# Patient Record
Sex: FEMALE | Race: WHITE | Employment: PART TIME | URBAN - METROPOLITAN AREA
[De-identification: names, ages, dates, MRNs, and addresses within clinical notes are randomized per-mention and may not be internally consistent; named-entity substitution may affect disease eponyms.]

---

## 2017-01-04 ENCOUNTER — OFFICE VISIT (OUTPATIENT)
Dept: INTERNAL MEDICINE CLINIC | Facility: CLINIC | Age: 57
End: 2017-01-04

## 2017-01-04 VITALS
HEART RATE: 55 BPM | SYSTOLIC BLOOD PRESSURE: 150 MMHG | DIASTOLIC BLOOD PRESSURE: 92 MMHG | RESPIRATION RATE: 14 BRPM | BODY MASS INDEX: 33.66 KG/M2 | WEIGHT: 190 LBS | HEIGHT: 63 IN

## 2017-01-04 DIAGNOSIS — I10 ESSENTIAL HYPERTENSION: Primary | ICD-10-CM

## 2017-01-04 PROCEDURE — 99214 OFFICE O/P EST MOD 30 MIN: CPT | Performed by: INTERNAL MEDICINE

## 2017-01-04 PROCEDURE — 99212 OFFICE O/P EST SF 10 MIN: CPT | Performed by: INTERNAL MEDICINE

## 2017-01-04 NOTE — PROGRESS NOTES
HPI:    Patient ID: Radha Centeno is a 49year old female. Hypertension  This is a chronic problem. The current episode started more than 1 year ago. The problem has been gradually worsening since onset. The problem is resistant.  Associated symptoms sounds are normal. She exhibits no distension. There is no tenderness. Musculoskeletal: Normal range of motion. Neurological: She is alert and oriented to person, place, and time. She has normal reflexes.    Skin: She is not diaphoretic.        01/04/17

## 2017-01-11 NOTE — TELEPHONE ENCOUNTER
Please advise on refill request    No labs    Hypertensive Medications  Protocol Criteria:  · Appointment scheduled in the past 6 months or in the next 3 months  · BMP or CMP in the past 12 months  · Creatinine result < 2  Recent Visits       Provider Salome

## 2017-01-12 RX ORDER — LOSARTAN POTASSIUM AND HYDROCHLOROTHIAZIDE 25; 100 MG/1; MG/1
TABLET ORAL
Qty: 90 TABLET | Refills: 3 | Status: SHIPPED | OUTPATIENT
Start: 2017-01-12 | End: 2018-11-05

## 2017-01-23 ENCOUNTER — LAB ENCOUNTER (OUTPATIENT)
Dept: LAB | Age: 57
End: 2017-01-23
Attending: INTERNAL MEDICINE
Payer: COMMERCIAL

## 2017-01-23 DIAGNOSIS — I10 ESSENTIAL HYPERTENSION, MALIGNANT: Primary | ICD-10-CM

## 2017-01-23 LAB
CHOLEST SERPL-MCNC: 315 MG/DL (ref 110–200)
HDLC SERPL-MCNC: 56 MG/DL
LDLC SERPL CALC-MCNC: 196 MG/DL (ref 0–99)
NONHDLC SERPL-MCNC: 259 MG/DL
TRIGL SERPL-MCNC: 314 MG/DL (ref 1–149)

## 2017-01-23 PROCEDURE — 80061 LIPID PANEL: CPT

## 2017-01-23 PROCEDURE — 36415 COLL VENOUS BLD VENIPUNCTURE: CPT

## 2017-02-27 ENCOUNTER — LAB ENCOUNTER (OUTPATIENT)
Dept: LAB | Age: 57
End: 2017-02-27
Attending: INTERNAL MEDICINE
Payer: COMMERCIAL

## 2017-02-27 DIAGNOSIS — E78.2 MIXED HYPERLIPIDEMIA: Primary | ICD-10-CM

## 2017-02-27 DIAGNOSIS — E78.5 HYPERLIPEMIA: ICD-10-CM

## 2017-02-27 LAB
CHOLEST SERPL-MCNC: 191 MG/DL (ref 110–200)
HDLC SERPL-MCNC: 31 MG/DL
LDLC SERPL CALC-MCNC: 97 MG/DL (ref 0–99)
NONHDLC SERPL-MCNC: 160 MG/DL
TRIGL SERPL-MCNC: 315 MG/DL (ref 1–149)

## 2017-02-27 PROCEDURE — 36415 COLL VENOUS BLD VENIPUNCTURE: CPT

## 2017-02-27 PROCEDURE — 80061 LIPID PANEL: CPT

## 2017-02-27 PROCEDURE — 82306 VITAMIN D 25 HYDROXY: CPT

## 2017-03-01 LAB — 25(OH)D3 SERPL-MCNC: 36.3 NG/ML

## 2017-08-21 ENCOUNTER — LAB ENCOUNTER (OUTPATIENT)
Dept: LAB | Age: 57
End: 2017-08-21
Attending: INTERNAL MEDICINE
Payer: COMMERCIAL

## 2017-08-21 DIAGNOSIS — I25.10 CORONARY ARTERY DISEASE: Primary | ICD-10-CM

## 2017-08-21 LAB
CHOLEST SERPL-MCNC: 201 MG/DL (ref 110–200)
HDLC SERPL-MCNC: 42 MG/DL
LDLC SERPL CALC-MCNC: 110 MG/DL (ref 0–99)
NONHDLC SERPL-MCNC: 159 MG/DL
TRIGL SERPL-MCNC: 244 MG/DL (ref 1–149)

## 2017-08-21 PROCEDURE — 36415 COLL VENOUS BLD VENIPUNCTURE: CPT

## 2017-08-21 PROCEDURE — 80061 LIPID PANEL: CPT

## 2017-11-22 ENCOUNTER — LAB ENCOUNTER (OUTPATIENT)
Dept: LAB | Age: 57
End: 2017-11-22
Attending: INTERNAL MEDICINE
Payer: COMMERCIAL

## 2017-11-22 DIAGNOSIS — I25.10 CORONARY ATHEROSCLEROSIS OF NATIVE CORONARY ARTERY: Primary | ICD-10-CM

## 2017-11-22 PROCEDURE — 80061 LIPID PANEL: CPT

## 2017-11-22 PROCEDURE — 36415 COLL VENOUS BLD VENIPUNCTURE: CPT

## 2018-01-15 RX ORDER — LOSARTAN POTASSIUM AND HYDROCHLOROTHIAZIDE 25; 100 MG/1; MG/1
TABLET ORAL
Qty: 90 TABLET | Refills: 0 | OUTPATIENT
Start: 2018-01-15

## 2018-02-20 ENCOUNTER — HOSPITAL ENCOUNTER (OUTPATIENT)
Age: 58
Discharge: HOME | End: 2018-02-20
Payer: COMMERCIAL

## 2018-02-20 VITALS
RESPIRATION RATE: 18 BRPM | SYSTOLIC BLOOD PRESSURE: 181 MMHG | WEIGHT: 165 LBS | HEART RATE: 60 BPM | BODY MASS INDEX: 29 KG/M2 | OXYGEN SATURATION: 98 % | DIASTOLIC BLOOD PRESSURE: 69 MMHG | TEMPERATURE: 98 F

## 2018-02-20 DIAGNOSIS — B34.9 ACUTE VIRAL SYNDROME: Primary | ICD-10-CM

## 2018-02-20 LAB — S PYO AG THROAT QL: NEGATIVE

## 2018-02-20 PROCEDURE — 99213 OFFICE O/P EST LOW 20 MIN: CPT

## 2018-02-20 PROCEDURE — 87430 STREP A AG IA: CPT

## 2018-02-20 PROCEDURE — 99204 OFFICE O/P NEW MOD 45 MIN: CPT

## 2018-02-20 RX ORDER — OFLOXACIN 3 MG/ML
SOLUTION AURICULAR (OTIC) DAILY
Qty: 1 BOTTLE | Refills: 0 | Status: SHIPPED | OUTPATIENT
Start: 2018-02-20 | End: 2018-02-27

## 2018-02-20 RX ORDER — OFLOXACIN 3 MG/ML
SOLUTION AURICULAR (OTIC) DAILY
Qty: 1 BOTTLE | Refills: 0 | Status: SHIPPED | OUTPATIENT
Start: 2018-02-20 | End: 2018-02-20

## 2018-02-20 RX ORDER — METOPROLOL SUCCINATE 50 MG/1
50 TABLET, EXTENDED RELEASE ORAL DAILY
COMMUNITY
End: 2018-11-05

## 2018-02-20 NOTE — ED PROVIDER NOTES
Patient presents with:  Cough/URI      HPI:     Philip Ramirez is a 56year old female with a past history of hypertension presents with cough, congestion and sore throat since Friday. Patient denies any fever chills.   Patient reports cough is wet and chest pain. Has not experienced any fever or chills. pt ear canals erythematous post irrigation. Will prescribe antibiotic drops. Instructed patient to continue supportive care at home as well as close follow-up with primary care provider.   Patient Judy Suero

## 2018-03-12 ENCOUNTER — APPOINTMENT (OUTPATIENT)
Dept: GENERAL RADIOLOGY | Age: 58
End: 2018-03-12
Attending: PEDIATRICS
Payer: COMMERCIAL

## 2018-03-12 ENCOUNTER — HOSPITAL ENCOUNTER (OUTPATIENT)
Age: 58
Discharge: HOME | End: 2018-03-12
Attending: PEDIATRICS
Payer: COMMERCIAL

## 2018-03-12 VITALS
TEMPERATURE: 98 F | SYSTOLIC BLOOD PRESSURE: 124 MMHG | RESPIRATION RATE: 16 BRPM | DIASTOLIC BLOOD PRESSURE: 62 MMHG | HEART RATE: 53 BPM | BODY MASS INDEX: 29.23 KG/M2 | OXYGEN SATURATION: 98 % | HEIGHT: 63 IN | WEIGHT: 165 LBS

## 2018-03-12 DIAGNOSIS — J45.21 MILD INTERMITTENT REACTIVE AIRWAY DISEASE WITH ACUTE EXACERBATION: ICD-10-CM

## 2018-03-12 DIAGNOSIS — J06.9 UPPER RESPIRATORY TRACT INFECTION, UNSPECIFIED TYPE: Primary | ICD-10-CM

## 2018-03-12 PROCEDURE — 71046 X-RAY EXAM CHEST 2 VIEWS: CPT | Performed by: PEDIATRICS

## 2018-03-12 PROCEDURE — 99214 OFFICE O/P EST MOD 30 MIN: CPT

## 2018-03-12 PROCEDURE — 94640 AIRWAY INHALATION TREATMENT: CPT

## 2018-03-12 RX ORDER — IPRATROPIUM BROMIDE AND ALBUTEROL SULFATE 2.5; .5 MG/3ML; MG/3ML
3 SOLUTION RESPIRATORY (INHALATION) ONCE
Status: COMPLETED | OUTPATIENT
Start: 2018-03-12 | End: 2018-03-12

## 2018-03-12 RX ORDER — PREDNISONE 20 MG/1
40 TABLET ORAL DAILY
Qty: 10 TABLET | Refills: 0 | Status: SHIPPED | OUTPATIENT
Start: 2018-03-12 | End: 2018-03-17

## 2018-03-12 RX ORDER — ALBUTEROL SULFATE 90 UG/1
2 AEROSOL, METERED RESPIRATORY (INHALATION) EVERY 4 HOURS PRN
Qty: 1 INHALER | Refills: 0 | Status: SHIPPED | OUTPATIENT
Start: 2018-03-12 | End: 2018-04-11

## 2018-03-12 NOTE — ED PROVIDER NOTES
Patient presents with:  Cough/URI      HPI:     Penny Koroma is a 56year old female who presents for evaluation of a chief complaint of upper respiratory symptoms for 3-4 days. Patient notes congestion and cough.   She feels some chest congestion an Intermittent nebulizer      predniSONE 20 MG Oral Tab          Sig: Take 2 tablets (40 mg total) by mouth daily.           Dispense:  10 tablet          Refill:  0      Albuterol Sulfate  (90 Base) MCG/ACT Inhalation Aero Soln          Sig: Inhale 2

## 2018-03-12 NOTE — ED INITIAL ASSESSMENT (HPI)
Cough with green phlegm, sinus pressure, congestion and runny nose since Friday. Denies fever, chills or body aches.

## 2018-03-14 ENCOUNTER — OFFICE VISIT (OUTPATIENT)
Dept: INTERNAL MEDICINE CLINIC | Facility: CLINIC | Age: 58
End: 2018-03-14

## 2018-03-14 VITALS
BODY MASS INDEX: 29.41 KG/M2 | HEIGHT: 63 IN | HEART RATE: 49 BPM | DIASTOLIC BLOOD PRESSURE: 86 MMHG | SYSTOLIC BLOOD PRESSURE: 152 MMHG | TEMPERATURE: 99 F | WEIGHT: 166 LBS

## 2018-03-14 DIAGNOSIS — I10 ESSENTIAL HYPERTENSION: ICD-10-CM

## 2018-03-14 DIAGNOSIS — J40 BRONCHITIS: Primary | ICD-10-CM

## 2018-03-14 PROCEDURE — 99214 OFFICE O/P EST MOD 30 MIN: CPT | Performed by: INTERNAL MEDICINE

## 2018-03-14 PROCEDURE — 99212 OFFICE O/P EST SF 10 MIN: CPT | Performed by: INTERNAL MEDICINE

## 2018-03-27 NOTE — PROGRESS NOTES
HPI:    Patient ID: Karine Johnson is a 56year old female. HPI     [facility] 3/12/18  Karine Johnson is a 56year old female who presents for evaluation of a chief complaint of upper respiratory symptoms for 3-4 days.   Patient notes congestion and cou disease with acute exacerbation J45.21        She was given a DuoNeb here and had improvement of her exam.  She was coughing less.   She states the personally she does not feel like it helped her very much but she was talking about from a congestion standpo Oral Tab TAKE 1 TABLET BY MOUTH DAILY Disp: 90 tablet Rfl: 3   AmLODIPine Besylate 2.5 MG Oral Tab TK 1 T PO QD Disp:  Rfl: 0     Allergies:  Codeine                     HISTORY:  Past Medical History:   Diagnosis Date   • Essential hypertension       Past REFILLED ALL MEDICATIONS AS NEEDED  SIDE EFFECTS DISCUSSED  REVIEWED ALL RECENT LABS AND DIAGNOSTIC TESTING IN DETAIL  REVIEWED URGENT CARE  RECORDS RECENTLY   TAKE ALL YOU MEDICATIONS AS DIRECTED    CALL MD OR FOLLOW UP IF YOU HAVE ANY QUESTIONS.   NEXT OF

## 2018-11-05 ENCOUNTER — OFFICE VISIT (OUTPATIENT)
Dept: INTERNAL MEDICINE CLINIC | Facility: CLINIC | Age: 58
End: 2018-11-05
Payer: COMMERCIAL

## 2018-11-05 ENCOUNTER — LAB ENCOUNTER (OUTPATIENT)
Dept: LAB | Age: 58
End: 2018-11-05
Attending: INTERNAL MEDICINE
Payer: COMMERCIAL

## 2018-11-05 VITALS
BODY MASS INDEX: 30.3 KG/M2 | WEIGHT: 171 LBS | DIASTOLIC BLOOD PRESSURE: 84 MMHG | SYSTOLIC BLOOD PRESSURE: 110 MMHG | HEIGHT: 63 IN | HEART RATE: 109 BPM

## 2018-11-05 DIAGNOSIS — Z12.11 COLON CANCER SCREENING: ICD-10-CM

## 2018-11-05 DIAGNOSIS — Z00.00 ROUTINE GENERAL MEDICAL EXAMINATION AT A HEALTH CARE FACILITY: ICD-10-CM

## 2018-11-05 DIAGNOSIS — Z00.00 ROUTINE GENERAL MEDICAL EXAMINATION AT A HEALTH CARE FACILITY: Primary | ICD-10-CM

## 2018-11-05 DIAGNOSIS — Z12.31 VISIT FOR SCREENING MAMMOGRAM: ICD-10-CM

## 2018-11-05 PROCEDURE — 83721 ASSAY OF BLOOD LIPOPROTEIN: CPT

## 2018-11-05 PROCEDURE — 80061 LIPID PANEL: CPT

## 2018-11-05 PROCEDURE — 83036 HEMOGLOBIN GLYCOSYLATED A1C: CPT

## 2018-11-05 PROCEDURE — 82306 VITAMIN D 25 HYDROXY: CPT

## 2018-11-05 PROCEDURE — 80053 COMPREHEN METABOLIC PANEL: CPT

## 2018-11-05 PROCEDURE — 84439 ASSAY OF FREE THYROXINE: CPT

## 2018-11-05 PROCEDURE — 81015 MICROSCOPIC EXAM OF URINE: CPT

## 2018-11-05 PROCEDURE — 36415 COLL VENOUS BLD VENIPUNCTURE: CPT

## 2018-11-05 PROCEDURE — 84443 ASSAY THYROID STIM HORMONE: CPT

## 2018-11-05 PROCEDURE — 99396 PREV VISIT EST AGE 40-64: CPT | Performed by: INTERNAL MEDICINE

## 2018-11-05 PROCEDURE — 85027 COMPLETE CBC AUTOMATED: CPT

## 2018-11-05 RX ORDER — METOPROLOL SUCCINATE 50 MG/1
50 TABLET, EXTENDED RELEASE ORAL DAILY
Qty: 90 TABLET | Refills: 3 | Status: SHIPPED | OUTPATIENT
Start: 2018-11-05 | End: 2019-11-01

## 2018-11-05 RX ORDER — AMLODIPINE BESYLATE 2.5 MG/1
TABLET ORAL
Qty: 90 TABLET | Refills: 3 | Status: SHIPPED | OUTPATIENT
Start: 2018-11-05

## 2018-11-05 RX ORDER — LOSARTAN POTASSIUM AND HYDROCHLOROTHIAZIDE 25; 100 MG/1; MG/1
1 TABLET ORAL
Qty: 90 TABLET | Refills: 3 | Status: SHIPPED | OUTPATIENT
Start: 2018-11-05 | End: 2019-11-01

## 2018-11-05 NOTE — PROGRESS NOTES
HPI:    Patient ID: Janusz Burr is a 66year old female.     HPI     Physical exam  Sees gyne  At [facility]  Will see derm for numerous moles    /84   Pulse 109   Ht 5' 3\" (1.6 m)   Wt 171 lb (77.6 kg)   BMI 30.29 kg/m²   Wt Readings from Last 6 Past Surgical History:   Procedure Laterality Date   • ABLATION        Family History   Problem Relation Age of Onset   • Cancer Father         lung and prostate   • Hypertension Brother       Social History: Social History    Tobacco Use      Smokin STIM HORMONE, FREE T4 (FREE         THYROXINE), LIPID PANEL, CBC, PLATELET; NO         DIFFERENTIAL, COMP METABOLIC PANEL (14),         HEMOGLOBIN A1C, URINE MICROSCOPIC W REFLEX         CULTURE        Generally healthy  Wt Readings from Last 6 Encounters:

## 2018-11-12 DIAGNOSIS — E78.5 ELEVATED LIPIDS: Primary | ICD-10-CM

## 2019-02-13 ENCOUNTER — OFFICE VISIT (OUTPATIENT)
Dept: INTERNAL MEDICINE CLINIC | Facility: CLINIC | Age: 59
End: 2019-02-13
Payer: COMMERCIAL

## 2019-02-13 VITALS
HEART RATE: 50 BPM | SYSTOLIC BLOOD PRESSURE: 124 MMHG | WEIGHT: 165 LBS | TEMPERATURE: 99 F | BODY MASS INDEX: 29.23 KG/M2 | DIASTOLIC BLOOD PRESSURE: 73 MMHG | HEIGHT: 63 IN

## 2019-02-13 DIAGNOSIS — Z12.4 CERVICAL CANCER SCREENING: ICD-10-CM

## 2019-02-13 DIAGNOSIS — Z00.00 ROUTINE GENERAL MEDICAL EXAMINATION AT A HEALTH CARE FACILITY: Primary | ICD-10-CM

## 2019-02-13 DIAGNOSIS — N76.1 SUBACUTE VAGINITIS: ICD-10-CM

## 2019-02-13 PROCEDURE — 99396 PREV VISIT EST AGE 40-64: CPT | Performed by: INTERNAL MEDICINE

## 2019-02-13 PROCEDURE — 88175 CYTOPATH C/V AUTO FLUID REDO: CPT | Performed by: INTERNAL MEDICINE

## 2019-02-13 RX ORDER — DOXEPIN HYDROCHLORIDE 25 MG/1
25 CAPSULE ORAL NIGHTLY
Qty: 90 CAPSULE | Refills: 0 | Status: SHIPPED | OUTPATIENT
Start: 2019-02-13 | End: 2019-10-17

## 2019-02-14 LAB — HPV I/H RISK 1 DNA SPEC QL NAA+PROBE: NEGATIVE

## 2019-02-15 LAB
GENITAL VAGINOSIS SCREEN: NEGATIVE
TRICHOMONAS SCREEN: NEGATIVE

## 2019-02-22 NOTE — PROGRESS NOTES
HPI:    Patient ID: Luis Lay is a 66year old female.     HPI    Physical exam  genearlly helathy  HTN  Long standing history of hypertension     sympotms  :        Headache no  dizziness        no                             Blurred vision no  pa and behavioral problems. Current Outpatient Medications:  Doxepin HCl 25 MG Oral Cap Take 1 capsule (25 mg total) by mouth nightly.  Disp: 90 capsule Rfl: 0   Metoprolol Succinate ER 50 MG Oral Tablet 24 Hr Take 1 tablet (50 mg total) by mouth adelaida hernia.    Genitourinary:   Genitourinary Comments: Breast  No asymmetry   No discrete palpable mass  No nipple discharge   No axillary adenpathy  Gyne: obesity limits accuracy   Uterus not well palpated  No adnexal tenderness and no palpable mass   Vaginal capsule (25 mg total) by mouth nightly.        Imaging & Referrals:  None        DE#[id number]

## 2019-10-18 RX ORDER — DOXEPIN HYDROCHLORIDE 25 MG/1
CAPSULE ORAL
Qty: 90 CAPSULE | Refills: 4 | Status: SHIPPED | OUTPATIENT
Start: 2019-10-18

## 2019-10-18 NOTE — TELEPHONE ENCOUNTER
Review pended refill request as it does not fall under a protocol.     Last Rx: 2/13/19  LOV: Recent Visits  Date Type Provider Dept   02/13/19 Office Visit MD Caity [facility]   11/05/18 Office Visit MD Ca[facility]

## 2019-10-18 NOTE — TELEPHONE ENCOUNTER
Please see patient mychart message below regarding medication       My only request is a change in dosage for my Doxepin that was prescribed to me for insomnia.  I have found that the 25mg needs to be upped to 75 mg, which is what I had been taking 4 years

## 2019-11-02 RX ORDER — LOSARTAN POTASSIUM AND HYDROCHLOROTHIAZIDE 25; 100 MG/1; MG/1
TABLET ORAL
Qty: 90 TABLET | Refills: 0 | Status: SHIPPED | OUTPATIENT
Start: 2019-11-02 | End: 2020-01-31

## 2019-11-02 RX ORDER — METOPROLOL SUCCINATE 50 MG/1
TABLET, EXTENDED RELEASE ORAL
Qty: 90 TABLET | Refills: 0 | Status: SHIPPED | OUTPATIENT
Start: 2019-11-02 | End: 2020-01-31

## 2020-01-30 ENCOUNTER — TELEPHONE (OUTPATIENT)
Dept: INTERNAL MEDICINE CLINIC | Facility: CLINIC | Age: 60
End: 2020-01-30

## 2020-01-31 RX ORDER — METOPROLOL SUCCINATE 50 MG/1
TABLET, EXTENDED RELEASE ORAL
Qty: 90 TABLET | Refills: 0 | Status: SHIPPED | OUTPATIENT
Start: 2020-01-31 | End: 2020-03-16

## 2020-01-31 RX ORDER — LOSARTAN POTASSIUM AND HYDROCHLOROTHIAZIDE 25; 100 MG/1; MG/1
TABLET ORAL
Qty: 90 TABLET | Refills: 0 | Status: SHIPPED | OUTPATIENT
Start: 2020-01-31 | End: 2020-03-16

## 2020-01-31 NOTE — TELEPHONE ENCOUNTER
Please review; protocol failed.     Requested Prescriptions   Pending Prescriptions Disp Refills   • METOPROLOL SUCCINATE ER 50 MG Oral Tablet 24 Hr [Pharmacy Med Name: METOPROLOL SUCC(TOPROL)ER TABS 50MG] 90 tablet 4     Sig: TAKE 1 TABLET DAILY       Hype

## 2020-03-16 ENCOUNTER — TELEPHONE (OUTPATIENT)
Dept: INTERNAL MEDICINE CLINIC | Facility: CLINIC | Age: 60
End: 2020-03-16

## 2020-03-16 RX ORDER — LOSARTAN POTASSIUM AND HYDROCHLOROTHIAZIDE 25; 100 MG/1; MG/1
1 TABLET ORAL
Qty: 90 TABLET | Refills: 0 | Status: SHIPPED | OUTPATIENT
Start: 2020-03-16

## 2020-03-16 RX ORDER — METOPROLOL SUCCINATE 50 MG/1
50 TABLET, EXTENDED RELEASE ORAL
Qty: 90 TABLET | Refills: 0 | Status: SHIPPED | OUTPATIENT
Start: 2020-03-16

## 2020-03-17 NOTE — TELEPHONE ENCOUNTER
AppMyDayhart message sent to patient regarding need for follow up. If patient does not read message or make an appt in next couple days, please phone patient with 1 phone attempt. Leave a detailed message on voicemail if able, close encounter.

## 2020-03-18 RX ORDER — LOSARTAN POTASSIUM AND HYDROCHLOROTHIAZIDE 25; 100 MG/1; MG/1
1 TABLET ORAL
Qty: 90 TABLET | Refills: 0 | Status: SHIPPED | OUTPATIENT
Start: 2020-03-18 | End: 2020-07-07

## 2020-03-18 RX ORDER — METOPROLOL SUCCINATE 50 MG/1
50 TABLET, EXTENDED RELEASE ORAL
Qty: 90 TABLET | Refills: 0 | Status: SHIPPED | OUTPATIENT
Start: 2020-03-18 | End: 2020-07-07

## 2020-03-18 NOTE — TELEPHONE ENCOUNTER
Patient stated medications were sent to the wrong pharmacy. Please send  Losartan Potassium-HCTZ 100-25 MG Oral Tab  Metoprolol Succinate ER 50 MG Oral Tablet 24 Hr    To [facility]     Thank you.

## 2020-07-07 RX ORDER — LOSARTAN POTASSIUM AND HYDROCHLOROTHIAZIDE 25; 100 MG/1; MG/1
TABLET ORAL
Qty: 90 TABLET | Refills: 3 | OUTPATIENT
Start: 2020-07-07

## 2020-07-07 RX ORDER — METOPROLOL SUCCINATE 50 MG/1
TABLET, EXTENDED RELEASE ORAL
Qty: 90 TABLET | Refills: 3 | OUTPATIENT
Start: 2020-07-07

## 2020-07-07 NOTE — TELEPHONE ENCOUNTER
Called patient and she did moved [address] and she found another doctor in [place] and thank you for refilling the medication [facility] to have Dr. Unruly Jacobson for a doctor

## 2020-07-07 NOTE — TELEPHONE ENCOUNTER
Please Refill for 1 month   Last OV more than a year  Ago  Call pt  PLEASE INFORM PATIENT, OFFICE VISIT REQUIRED PRIOR TO ANY ADDITIONAL REFILLS

## (undated) NOTE — LETTER
12/05/18        [address] [place] [address] [address]      Dear Joann Shelley records indicate that you have outstanding lab work and or testing that was ordered for you and has not yet been completed:  Orders Placed This Encou

## (undated) NOTE — MR AVS SNAPSHOT
[address], [address]  [address] [facility]  [phone]               Thank you for choosing us for your health care visit with Verena Odell MD.  We are glad to serve you and happy to provide you with this summary Your blood pressure indicates you may be at-risk for Hypertension. Please consider the following Lifestyle Modifications. Also, please return for a follow-up Blood Pressure Check in 1 month.      Lifestyle Modification Recommendations:    Modification R

## (undated) NOTE — LETTER
February 15, 2019     [address] [place] [address] [address]      Dear William Frazier:    Below are the results from your recent visit:  Yeast negative     Pap negative     Resulted Orders   HPV HIGH RISK , THIN PREP COLLECTION   [facility] Cyt[facility]                              Case: [id number]                                  Authorizing Provider:  Janina Elam MD        Collected:           02/13/2019 12:06 PM          Ordering Location:     [address] [place] [address]

## (undated) NOTE — LETTER
03/14/19        [address] [place] [address] [address]      Dear Viki Eisenmenger records indicate that you have outstanding lab work and or testing that was ordered for you and has not yet been completed:  Orders Placed This Encou

## (undated) NOTE — LETTER
February 14, 2019     [address] [place] [address] [address]      Dear Lorre Gowers:    Below are the results from your recent visit:    Vaginosis screen  Negative so far   Yeast pending    Resulted Orders   GENITAL VAGINOSIS SCREEN

## (undated) NOTE — LETTER
February 14, 2019     [address] [place] [address] [address]      Dear Beatriz Connolly:    Below are the results from your recent visit:  HPV negative   Resulted Orders   HPV HIGH RISK , THIN PREP COLLECTION   Result Value Ref Range